# Patient Record
Sex: MALE | Race: WHITE | NOT HISPANIC OR LATINO | Employment: FULL TIME | URBAN - METROPOLITAN AREA
[De-identification: names, ages, dates, MRNs, and addresses within clinical notes are randomized per-mention and may not be internally consistent; named-entity substitution may affect disease eponyms.]

---

## 2020-02-20 ENCOUNTER — OFFICE VISIT (OUTPATIENT)
Dept: FAMILY MEDICINE | Facility: CLINIC | Age: 30
End: 2020-02-20
Payer: COMMERCIAL

## 2020-02-20 VITALS
HEART RATE: 45 BPM | DIASTOLIC BLOOD PRESSURE: 67 MMHG | TEMPERATURE: 97.7 F | SYSTOLIC BLOOD PRESSURE: 111 MMHG | WEIGHT: 200.8 LBS | BODY MASS INDEX: 28.75 KG/M2 | HEIGHT: 70 IN | OXYGEN SATURATION: 98 % | RESPIRATION RATE: 20 BRPM

## 2020-02-20 DIAGNOSIS — N20.0 KIDNEY STONE: Primary | ICD-10-CM

## 2020-02-20 DIAGNOSIS — F19.10 POLYSUBSTANCE ABUSE (H): ICD-10-CM

## 2020-02-20 DIAGNOSIS — N20.0 NEPHROLITHIASIS: ICD-10-CM

## 2020-02-20 PROBLEM — F10.21 ALCOHOLISM IN REMISSION (H): Status: ACTIVE | Noted: 2020-02-20

## 2020-02-20 PROBLEM — D17.9 LIPOMA: Status: ACTIVE | Noted: 2020-02-20

## 2020-02-20 LAB
BILIRUBIN UR: NEGATIVE MG/DL
BLOOD UR: ABNORMAL MG/DL
BUN SERPL-MCNC: 16 MG/DL (ref 5–24)
CALCIUM SERPL-MCNC: 9.1 MG/DL (ref 8.5–10.4)
CHLORIDE SERPLBLD-SCNC: 102 MMOL/L (ref 94–109)
CO2 SERPL-SCNC: 28 MMOL/L (ref 20–32)
CREAT SERPL-MCNC: 1.1 MG/DL (ref 0.8–1.5)
EGFR CALCULATED (BLACK REFERENCE): >90 ML/MIN
EGFR CALCULATED (NON BLACK REFERENCE): 83.5 ML/MIN
GLUCOSE SERPL-MCNC: 92 MG/DL (ref 60–109)
GLUCOSE URINE: NEGATIVE
KETONES UR QL: NEGATIVE MG/DL
LEUKOCYTE ESTERASE UR: NEGATIVE
NITRITE UR QL STRIP: NEGATIVE MG/DL
PH UR STRIP: 5.5 [PH] (ref 4.5–8)
POTASSIUM SERPL-SCNC: 3.5 MMOL/L (ref 3.4–5.3)
PROTEIN UR: NEGATIVE MG/DL
SODIUM SERPL-SCNC: 141 MMOL/L (ref 133–144)
SP GR UR STRIP: <=1.005 (ref 1–1.03)
UROBILINOGEN UR STRIP-ACNC: ABNORMAL E.U./DL

## 2020-02-20 ASSESSMENT — MIFFLIN-ST. JEOR: SCORE: 1877.07

## 2020-02-20 NOTE — PROGRESS NOTES
Preceptor Attestation:   Patient seen, evaluated and discussed with the resident. I have verified the content of the note, which accurately reflects my assessment of the patient and the plan of care.  Supervising Physician:Sae Jaime MD  [facility]

## 2020-02-20 NOTE — PROGRESS NOTES
"  Assessment and Plan     Nephrolithiasis: First episode 1/30/20 at [facility] ED. Described RLQ/Right pelvic pain, that was sudden and severe, but passed after 5-6hrs. Hematuria as well. No flank pain  - Pain resolved.  - Had red/pink urine for ~ 2 weeks after. Has since resolved.  - Recommended staying hydrated   - BMP  - UA    Hx Polysubstance Abuse: 5 years sober from alcohol. 2-3 years sober from \"benzos, meth, adderall, opioids\" and others.   - Does take Viagra \"for fun\"  - Recommended he stop taking Viagra as he doesn't have a problem maintaining or getting an erection.    Options for treatment and follow-up care were reviewed with the patient and/or guardian. Howard Motta and/or guardian engaged in the decision making process and verbalized understanding of the options discussed and agreed with the final plan.    Jerrod Koehler DO, JACKY  [facility], PGY-1    Precepted patient with Dr. Sae Jaime       HPI:   Howard Motta is a 29 year old male who presents to clinic today for   Chief Complaint   Patient presents with     ER F/U     Kidney stone?       ED visit on 1/30 for RLQ/Rpelvic pain, resolved with urination. No buldge, pain vague not completely pin pointable. Scrotum to RLQ. Started when lifted jewelery case in AM.  Occassional pain after in testicle.     Runner, frequently >12miles 3x per week. Pink pee for 1 week after.     Cysts on L arm, multiple. Dx as Lipomas previously.     No supplements. Takes viagra for fun. Decreased interest in girl friend. No problems getting or maintaining erection.    Hx drug use, benzos, opioids, amphetamines, meth, prescription drugs. 2-3 years. 5 years sober alcohol. Depression after that.     Denies CP, SOB, abdominal pain, pain with urination/testicles, abdominal pain, changes in vision/hearing/GI, or any other concerns.          Review of Systems:     A complete 12 point ROS was negative " "unless otherwise noted in HPI.          PMHX:   Active Problems List  Patient Active Problem List   Diagnosis     Lipoma     Alcoholism in remission (H)     Active problem list reviewed and updated.    Current Medications  No current outpatient medications on file.     Medication list reviewed and updated.    Social History  Social History     Tobacco Use     Smoking status: Never Smoker     Smokeless tobacco: Never Used   Substance Use Topics     Alcohol use: None     Drug use: None     History   Drug Use Not on file     Social history reviewed and updated.    Family History  No family history on file.  Family history reviewed and updated.    Allergies  Allergies   Allergen Reactions     No Known Allergies      Allergies and medication intolerances reviewed and updated.         Physical Exam:     Vitals:    02/20/20 1416   BP: 111/67   Pulse: (!) 45   Resp: 20   Temp: 97.7  F (36.5  C)   SpO2: 98%   Weight: 91.1 kg (200 lb 12.8 oz)   Height: 1.778 m (5' 10\")     Body mass index is 28.81 kg/m .    GENERAL APPEARANCE: alert, appears stated age, no acute distress  HEENT: Eyes grossly normal to inspection, nares normal, and mouth and throat without erythema, ulcers, or lesions  RESP: lungs clear to auscultation - no rales, rhonchi, or wheezes  CV: regular rate and rhythm, no murmur, click, rub, or gallop  ABDOMEN: soft, nontender   MSK: extremities normal, no gross deformities noted, no lower extremity edema  SKIN: multiple lipomas on L arm, largest ~3cm diameter.   PSYCH: mood and affect normal/bright       "

## 2020-02-21 PROBLEM — F19.10 POLYSUBSTANCE ABUSE (H): Status: ACTIVE | Noted: 2020-02-21

## 2020-02-21 PROBLEM — N20.0 NEPHROLITHIASIS: Status: ACTIVE | Noted: 2020-02-21

## 2021-11-08 ENCOUNTER — OFFICE VISIT (OUTPATIENT)
Dept: FAMILY MEDICINE | Facility: CLINIC | Age: 31
End: 2021-11-08
Payer: COMMERCIAL

## 2021-11-08 VITALS
RESPIRATION RATE: 16 BRPM | TEMPERATURE: 97.9 F | BODY MASS INDEX: 28.84 KG/M2 | OXYGEN SATURATION: 97 % | HEART RATE: 60 BPM | WEIGHT: 206 LBS | SYSTOLIC BLOOD PRESSURE: 137 MMHG | HEIGHT: 71 IN | DIASTOLIC BLOOD PRESSURE: 80 MMHG

## 2021-11-08 DIAGNOSIS — Z00.00 WELLNESS EXAMINATION: Primary | ICD-10-CM

## 2021-11-08 DIAGNOSIS — Z23 NEED FOR PROPHYLACTIC VACCINATION AND INOCULATION AGAINST INFLUENZA: ICD-10-CM

## 2021-11-08 DIAGNOSIS — F32.A DEPRESSION, UNSPECIFIED DEPRESSION TYPE: ICD-10-CM

## 2021-11-08 LAB
FERRITIN SERPL-MCNC: 186 NG/ML (ref 27–300)
HIV 1+2 AB+HIV1 P24 AG SERPL QL IA: NEGATIVE

## 2021-11-08 PROCEDURE — 90686 IIV4 VACC NO PRSV 0.5 ML IM: CPT | Performed by: STUDENT IN AN ORGANIZED HEALTH CARE EDUCATION/TRAINING PROGRAM

## 2021-11-08 PROCEDURE — 90471 IMMUNIZATION ADMIN: CPT | Performed by: STUDENT IN AN ORGANIZED HEALTH CARE EDUCATION/TRAINING PROGRAM

## 2021-11-08 PROCEDURE — 82728 ASSAY OF FERRITIN: CPT | Performed by: STUDENT IN AN ORGANIZED HEALTH CARE EDUCATION/TRAINING PROGRAM

## 2021-11-08 PROCEDURE — 86803 HEPATITIS C AB TEST: CPT | Performed by: STUDENT IN AN ORGANIZED HEALTH CARE EDUCATION/TRAINING PROGRAM

## 2021-11-08 PROCEDURE — 87389 HIV-1 AG W/HIV-1&-2 AB AG IA: CPT | Performed by: STUDENT IN AN ORGANIZED HEALTH CARE EDUCATION/TRAINING PROGRAM

## 2021-11-08 PROCEDURE — 99395 PREV VISIT EST AGE 18-39: CPT | Mod: 25 | Performed by: STUDENT IN AN ORGANIZED HEALTH CARE EDUCATION/TRAINING PROGRAM

## 2021-11-08 PROCEDURE — 36415 COLL VENOUS BLD VENIPUNCTURE: CPT | Performed by: STUDENT IN AN ORGANIZED HEALTH CARE EDUCATION/TRAINING PROGRAM

## 2021-11-08 ASSESSMENT — MIFFLIN-ST. JEOR: SCORE: 1904.41

## 2021-11-08 NOTE — PROGRESS NOTES
Male Physical Note         CC     Howard Motta is an 31 year old male who presents for preventative health visit.     History reviewed. No pertinent past medical history.         HPI     Besides routine health maintenance. Would also like referral to behavioral health to see a therapist again. Notes mild depression/low mood since breaking up with girl friend recently. No SI/SH.            Health Screening, Medical and Social History     Diet (5-7 servings of fruits/veg daily): Yes   Exercise (30 min accumulated most days):Yes  Dental Care: Yes   Seat Belt Use: Yes     Tobacco Use/Smoking: None  Illicit Drug Use: None  Do you use alcohol? No    Sexually Active: Yes  Sexual concerns: Yes   STD History: Chlamydia but treated for this.     Abuse: Current or Past(Physical, Sexual or Emotional)- No  Do you feel safe in your environment - Yes    Social History     Tobacco Use     Smoking status: Never Smoker     Smokeless tobacco: Never Used   Substance Use Topics     Alcohol use: Not on file     The patient does not drink >3 drinks per day nor >7 drinks per week.    Reviewed health maintenance and updated orders accordingly - Yes    All Histories reviewed and updated in Epic.         Review of Systems:     CONSTITUTIONAL: NEGATIVE for fever, chills, change in weight  INTEGUMENTARY/SKIN: NEGATIVE for worrisome rashes, moles or lesions  EYES: NEGATIVE for vision changes or irritation  ENT: NEGATIVE for ear, mouth and throat problems  RESP: NEGATIVE for significant cough or SOB  CV: NEGATIVE for chest pain, palpitations or peripheral edema  GI: NEGATIVE for nausea, abdominal pain, heartburn, or change in bowel habits   male: negative for dysuria, hematuria, decreased urinary stream, erectile dysfunction, urethral discharge  NEURO: NEGATIVE for weakness, dizziness or paresthesias  PSYCHIATRIC: NEGATIVE for changes in mood or affect         Physical Exam:     /80   Pulse 60   Temp 97.9  F (36.6  C) (Oral)   Resp 16   " Ht 1.8 m (5' 10.87\")   Wt 93.4 kg (206 lb)   SpO2 97%   BMI 28.84 kg/m    GENERAL APPEARANCE: healthy, alert and no distress  EYES: Eyes grossly normal to inspection, PERRL and conjunctivae and sclerae normal  HENT: ear canals and TM's normal, nose and mouth without ulcers or lesions, oropharynx clear and oral mucous membranes moist  NECK: no adenopathy, no asymmetry, masses, or scars and thyroid normal to palpation  RESP: lungs clear to auscultation - no rales, rhonchi or wheezes  CV: regular rates and rhythm, normal S1 S2, no S3 or S4, no murmur, click or rub, no peripheral edema and peripheral pulses strong  ABDOMEN: soft, nontender, no hepatosplenomegaly, no masses and bowel sounds normal  RECTAL: normal sphincter tone, no rectal masses, prostate of normal size, smooth, nontender without nodules or masses  MS: no musculoskeletal defects are noted and gait is age appropriate without ataxia. Negative right knee ant/post drawer, TTP, valgus/varus strain, Thessaly test.   SKIN: no suspicious lesions or rashes  NEURO: Normal strength and tone, sensory exam grossly normal, mentation intact and speech normal  PSYCH: mentation appears normal and affect normal/bright         Counseling:     Counseling:  Reviewed preventive health counseling, as reflected in patient instructions       Regular exercise       Healthy diet/nutrition       Safe sex practices/STD prevention       Consider Hep C screening for all patients one time for ages 18-79 years       HIV screeninx in teen years, 1x in adult years, and at intervals if high risk    Assessment and Plan:      Howard was seen today for physical, knee pain and imm/inj.    Diagnoses and all orders for this visit:    Need for prophylactic vaccination and inoculation against influenza  -     INFLUENZA VACCINE IM > 6 MONTHS VALENT IIV4 (AFLURIA/FLUZONE)    Wellness examination  -     Hepatitis C antibody; Future  -     HIV Ag/Ab Screen Cascade; Future  -     Ferritin; " Future  -     Hepatitis C antibody  -     HIV Ag/Ab Screen Cascade  -     Ferritin    Depression, unspecified depression type  -     MENTAL HEALTH REFERRAL  -; Future          Jerrod Koehler DO, MBA    Precepted today with:Dr. Pretty

## 2021-11-08 NOTE — PROGRESS NOTES
Preceptor Attestation:   Patient seen, evaluated and discussed with the resident. I have verified the content of the note, which accurately reflects my assessment of the patient and the plan of care.   Supervising Physician:  Marco Antonio Pretty MD

## 2021-11-09 LAB — HCV AB SERPL QL IA: NEGATIVE

## 2021-11-11 ENCOUNTER — DOCUMENTATION ONLY (OUTPATIENT)
Dept: PSYCHOLOGY | Facility: CLINIC | Age: 31
End: 2021-11-11
Payer: COMMERCIAL

## 2021-11-11 ENCOUNTER — TELEPHONE (OUTPATIENT)
Dept: FAMILY MEDICINE | Facility: CLINIC | Age: 31
End: 2021-11-11
Payer: COMMERCIAL

## 2021-11-11 NOTE — PROGRESS NOTES
Patient feeling down after recent breakup with girlfriend. Can be seen by the following:    [facility] in person available 21  [address]  [address]  [place], [place] [address]  [phone] Phone  [phone] Fax  Hours: M-F 7:30-5:30pm    [facility] [place]- virtual or telephone care only  [address]  [place], [place] [address] [phone] Phone  [phone] Fax  Hours:  Monday - Friday 8:30 - 5:00pm  8:00am - 5:00pm Saturday    [facility]- some providers offering in person care  [address]  [place], [place] [address] [phone] Phone  [phone] Fax  M-F 7:30AM-7PM  Saturday 8AM-2PM    [facility])- some providers offering in person care  [address] [place] [address] [address],   [place] [address]  [phone] Phone  [phone] Fax  M-Th: 8:30-5pm  F: 8:30-4:30pm    [facility]- both virtual and in person, many offering in person  [address] [place] [address]  [phone] Phone  [phone] Fax  M-Th 8-8pm  F 8-4:30pm        Patient Demographics    Patient Name   Howard Palomino MRN   [id number] Legal Sex   Male    1990 [employer]   [id number] Address   [address] [place] [address] Phone   [phone] (Home) *Preferred*   [phone] (Mobile)     Primary Visit Coverage    Payer Plan Sponsor Code Group Number Group Name Payer Address Payer Phone   [facility] [employer] [id number]  [address] [address] [phone]        [place] [address]        Primary Visit Coverage Subscriber    Subscriber ID Subscriber Name Subscriber N Subscriber Address   [id number] HOWARD PALOMINO [id number] [place] [address]       Order Information    Date Department Ordering/Authorizing   2021 [facility] Steven Koehler MD      Order Providers    Authorizing Provider Encounter Provider   Steven Koehler MD Kuzj, Lucas John Gregory, MD     Future Order Information    Expected Expires      11/8/2021 (Approximate) 11/8/2022               Associated Diagnoses    Depression, unspecified depression type         Comments    Use this form for behavioral health consults and assessments. The referral coordinator will help to determine whether patients are best served by clinic behavioral health staff or by community providers.     Presenting Problem: depressed mood     Currently having suicidal thoughts: No   Previous psych hospitalization: No       Type of referral(s) requested (indicate all that apply):   - Psychotherapy      needed:No   Language: English            Order Questions    Question Answer   Adult or Child/Adolescent: Adult   Location: [facility]

## 2021-11-11 NOTE — TELEPHONE ENCOUNTER
Outreach Date: 11/11/21 Time: 1610.  Able to be Reached: Yes  Voicemail Left: Yes  Reason for Referral: depressed mood  Location of appointment: [facility] Laurence Hernandez  Appointment Date:   Appointment Time:   Phone number of location: [phone]  Fax number of location: [phone]  Additional Information: Faxed referral information to Fransisco Pagan RN

## 2021-11-11 NOTE — TELEPHONE ENCOUNTER
Outreach Date: 11/11/21 Time: 1419.  Able to be Reached: No  Voicemail Left: Yes  Reason for Referral: depressed mood  Location of appointment:   Appointment Date:   Appointment Time:   Phone number of location:   Fax number of location:   Additional Information: called but no answer. Left VM to call back on unidentified VM.  Ej GASPAR

## 2021-12-20 ENCOUNTER — OFFICE VISIT (OUTPATIENT)
Dept: FAMILY MEDICINE | Facility: CLINIC | Age: 31
End: 2021-12-20
Payer: COMMERCIAL

## 2021-12-20 VITALS
BODY MASS INDEX: 29.49 KG/M2 | SYSTOLIC BLOOD PRESSURE: 117 MMHG | WEIGHT: 206 LBS | OXYGEN SATURATION: 98 % | TEMPERATURE: 97.9 F | HEART RATE: 55 BPM | RESPIRATION RATE: 18 BRPM | DIASTOLIC BLOOD PRESSURE: 66 MMHG | HEIGHT: 70 IN

## 2021-12-20 DIAGNOSIS — Z23 HIGH PRIORITY FOR 2019-NCOV VACCINE: ICD-10-CM

## 2021-12-20 DIAGNOSIS — Z11.3 ROUTINE SCREENING FOR STI (SEXUALLY TRANSMITTED INFECTION): Primary | ICD-10-CM

## 2021-12-20 PROCEDURE — 86780 TREPONEMA PALLIDUM: CPT | Performed by: STUDENT IN AN ORGANIZED HEALTH CARE EDUCATION/TRAINING PROGRAM

## 2021-12-20 PROCEDURE — 99213 OFFICE O/P EST LOW 20 MIN: CPT | Mod: 25 | Performed by: STUDENT IN AN ORGANIZED HEALTH CARE EDUCATION/TRAINING PROGRAM

## 2021-12-20 PROCEDURE — 87389 HIV-1 AG W/HIV-1&-2 AB AG IA: CPT | Performed by: STUDENT IN AN ORGANIZED HEALTH CARE EDUCATION/TRAINING PROGRAM

## 2021-12-20 PROCEDURE — 87591 N.GONORRHOEAE DNA AMP PROB: CPT | Performed by: STUDENT IN AN ORGANIZED HEALTH CARE EDUCATION/TRAINING PROGRAM

## 2021-12-20 PROCEDURE — 91300 COVID-19,PF,PFIZER (12+ YRS): CPT | Performed by: STUDENT IN AN ORGANIZED HEALTH CARE EDUCATION/TRAINING PROGRAM

## 2021-12-20 PROCEDURE — 36415 COLL VENOUS BLD VENIPUNCTURE: CPT | Performed by: STUDENT IN AN ORGANIZED HEALTH CARE EDUCATION/TRAINING PROGRAM

## 2021-12-20 PROCEDURE — 87491 CHLMYD TRACH DNA AMP PROBE: CPT | Performed by: STUDENT IN AN ORGANIZED HEALTH CARE EDUCATION/TRAINING PROGRAM

## 2021-12-20 PROCEDURE — 0004A COVID-19,PF,PFIZER (12+ YRS): CPT | Performed by: STUDENT IN AN ORGANIZED HEALTH CARE EDUCATION/TRAINING PROGRAM

## 2021-12-20 ASSESSMENT — MIFFLIN-ST. JEOR: SCORE: 1890.66

## 2021-12-20 NOTE — PROGRESS NOTES
"     HPI:       Howard Motta is a 31 year old  male with PMH of alcoholism in remission who presents for STI screening.    Recently had intervourse with \"high risk partners\". Patient has multiple sexual partners, has 4-5 currently. Sometimes uses condoms. No penile dishcarge, no dysuria. No skin changes. STD check today. Last had unprotected intercourse this week.          PMHX:     Patient Active Problem List   Diagnosis     Lipoma     Alcoholism in remission (H)     Nephrolithiasis     Polysubstance abuse (H)       No current outpatient medications on file.          Allergies   Allergen Reactions     No Known Allergies        No results found for this or any previous visit (from the past 24 hour(s)).         Review of Systems:     12 point review of systems negative unless stated in HPI          Physical Exam:     Vitals:    12/20/21 1152   BP: 117/66   BP Location: Right arm   Pulse: 55   Resp: 18   Temp: 97.9  F (36.6  C)   TempSrc: Oral   SpO2: 98%   Weight: 93.4 kg (206 lb)   Height: 1.778 m (5' 10\")     Body mass index is 29.56 kg/m .    GENERAL APPEARANCE: healthy, alert and no distress  EYES: Eyes grossly normal to inspection  RESP: lungs clear to auscultation - no rales, rhonchi or wheezes  CV: regular rate and rhythm,  and no murmur, click,  rub or gallop  ABDOMEN: soft, nontender, no suprapubic tenderness.   MS: extremities normal- no gross deformities noted  SKIN: no suspicious lesions or rashes  NEURO: Normal strength and tone, sensory exam grossly normal, mentation appears intact and speech normal  PSYCH: mood and affect normal/bright      Assessment and Plan     1. Routine screening for STI (sexually transmitted infection)  Patient currently asymptomatic but would like STI screen. Counseled him that consistent condom use is the best way to prevent STI's. Patient stated understanding. Condoms supplied to patient from clinic.   - Syphilis Screen Cascade; Future  - HIV Ag/Ab Screen Cascade; Future  - " Neisseria gonorrhoeae PCR; Future  - Chlamydia trachomatis PCR; Future  - Syphilis Screen Cascade  - HIV Ag/Ab Screen Cascade  - Neisseria gonorrhoeae PCR  - Chlamydia trachomatis PCR    2. High priority for 2019-nCoV vaccine  - COVID-19,PF,PFIZER (12+ Yrs PURPLE LABEL)    Options for treatment and follow-up care were reviewed with the patient and/or guardian. Pt and/or guardian engaged in the decision making process and verbalized understanding of the options discussed and agreed with the final plan.    Precepted today with: MD Nila Fleming MD  [facility] Family Medicine Resident PGY-3  [facility]

## 2021-12-20 NOTE — LETTER
December 24, 2021      Howard ORTA Kalia  [address] [place] [address]        Dear ,    We are writing to inform you of your test results.    Your STI panel including gonorrhea, chlamydia, HIV, and  syphilis are all negative.      Resulted Orders   Syphilis Screen Cascade   Result Value Ref Range    Treponema Antibody Total Nonreactive Nonreactive   HIV Ag/Ab Screen Cascade   Result Value Ref Range    HIV Antigen Antibody Combo Negative Negative   Neisseria gonorrhoeae PCR   Result Value Ref Range    Neisseria gonorrhoeae Negative Negative      Comment:      Negative for N. gonorrhoeae rRNA by transcription mediated amplification. A negative result by transcription mediated amplification does not preclude the presence of C. trachomatis infection because results are dependent on proper and adequate collection, absence of inhibitors and sufficient rRNA to be detected.   Chlamydia trachomatis PCR   Result Value Ref Range    Chlamydia trachomatis Negative Negative      Comment:      A negative result by transcription mediated amplification does not preclude the presence of C. trachomatis infection because results are dependent on proper and adequate collection, absence of inhibitors and sufficient rRNA to be detected.       If you have any questions or concerns, please call the clinic at the number listed above.       Sincerely,      Nila Lezama MD

## 2021-12-21 LAB
C TRACH DNA SPEC QL NAA+PROBE: NEGATIVE
HIV 1+2 AB+HIV1 P24 AG SERPL QL IA: NEGATIVE
N GONORRHOEA DNA SPEC QL NAA+PROBE: NEGATIVE
T PALLIDUM AB SER QL: NONREACTIVE

## 2021-12-23 NOTE — RESULT ENCOUNTER NOTE
Please call patient with the following:    Elías [place],    Your STI panel including gonorrhea, chlamydia, HIV, and  syphilis are all negative.    Dr. Lezama

## 2021-12-27 NOTE — PROGRESS NOTES
Faculty Supervision of Residents   I have examined this patient and the medical care has been evaluated and discussed with the resident. See resident note outlining our discussion.      Naila Snow MD

## 2022-01-09 ENCOUNTER — TELEPHONE (OUTPATIENT)
Dept: FAMILY MEDICINE | Facility: CLINIC | Age: 32
End: 2022-01-09
Payer: COMMERCIAL

## 2022-01-09 NOTE — CONFIDENTIAL NOTE
1/8/22     Called clinic number for question. I was on call.     Abdominal pain  Just wants to make sure it is not kidney stones    Onset: this AM.   Course: constant, maybe a bit better   Location: diffuse abdomen   Radiation: no  Description: dull   Intensity: was 7/10, now down to 3/10.   Exacerbating factors: not eating   Remitting factors: eating. Did try some pepto bismol; may have helped  He noticed that he has been eating more cheese recently. Started drinking carbonated water 3 days ago after many months of not doing slo.    Symptoms associated: none   Denies: nausea, vomiting, urinary symptoms (such as dysuria, hematuria, etc), stool changes, blood in stool, fever, chills, chest pain, dyspnea, palpitations.     Discussed that I am reassured his pain is getting better. It does not sound as if it needs immediate in-person evaluation. Consideration would be GERD vs carbonation reaction vs constipation / lactose intolerance (with cheese) vs less likely ulcer vs other. Recommended continuing peptobismol if that helps. Could try tums at the least. For consipation, decrease cheese, could try stool softeners). If he tries these measures and still no improvement, please be seen in clinic. If he suddenly worsens, likely present to urgent care vs ED.     Jony Clancy,    [facility] Family Medicine Resident   Pager#[phone]

## 2022-09-19 ENCOUNTER — OFFICE VISIT (OUTPATIENT)
Dept: FAMILY MEDICINE | Facility: CLINIC | Age: 32
End: 2022-09-19
Payer: COMMERCIAL

## 2022-09-19 VITALS
OXYGEN SATURATION: 98 % | DIASTOLIC BLOOD PRESSURE: 74 MMHG | BODY MASS INDEX: 29.78 KG/M2 | SYSTOLIC BLOOD PRESSURE: 123 MMHG | RESPIRATION RATE: 18 BRPM | HEART RATE: 55 BPM | TEMPERATURE: 98.1 F | WEIGHT: 208 LBS | HEIGHT: 70 IN

## 2022-09-19 DIAGNOSIS — D17.9 MULTIPLE LIPOMAS: ICD-10-CM

## 2022-09-19 DIAGNOSIS — Z11.3 SCREEN FOR STD (SEXUALLY TRANSMITTED DISEASE): Primary | ICD-10-CM

## 2022-09-19 PROCEDURE — 87389 HIV-1 AG W/HIV-1&-2 AB AG IA: CPT | Performed by: STUDENT IN AN ORGANIZED HEALTH CARE EDUCATION/TRAINING PROGRAM

## 2022-09-19 PROCEDURE — 87491 CHLMYD TRACH DNA AMP PROBE: CPT | Performed by: STUDENT IN AN ORGANIZED HEALTH CARE EDUCATION/TRAINING PROGRAM

## 2022-09-19 PROCEDURE — 86780 TREPONEMA PALLIDUM: CPT | Performed by: STUDENT IN AN ORGANIZED HEALTH CARE EDUCATION/TRAINING PROGRAM

## 2022-09-19 PROCEDURE — 87591 N.GONORRHOEAE DNA AMP PROB: CPT | Performed by: STUDENT IN AN ORGANIZED HEALTH CARE EDUCATION/TRAINING PROGRAM

## 2022-09-19 PROCEDURE — 36415 COLL VENOUS BLD VENIPUNCTURE: CPT | Performed by: STUDENT IN AN ORGANIZED HEALTH CARE EDUCATION/TRAINING PROGRAM

## 2022-09-19 PROCEDURE — 99213 OFFICE O/P EST LOW 20 MIN: CPT | Mod: GC | Performed by: STUDENT IN AN ORGANIZED HEALTH CARE EDUCATION/TRAINING PROGRAM

## 2022-09-19 NOTE — PROGRESS NOTES
CHIEF COMPLAINT                                                      Chief Complaint   Patient presents with     STD     testing       ASSESSMENT/PLAN:     (Z11.3) Screen for STD (sexually transmitted disease)  (primary encounter diagnosis)  Plan: Chlamydia trachomatis/Neisseria gonorrhoeae by         PCR - Clinic Collect, HIV Antigen Antibody         Combo, Syphilis Screen Cascade (RPR/VDRL)    (D17.9) Multiple lipomas  Comment: seems consistent w/ familial multiple lipomatosis.  Plan:   -Monitor, advised letting us know if they ever cause him issue or if he wants them removed for cosmetic reasons        Options for treatment and follow-up care were reviewed with the patient and/or guardian. Howard Motta and/or guardian engaged in the decision making process and verbalized understanding of the options discussed and agreed with the final plan    Precepted with Dr. Candelario.    Mack Hernandez MD - PGY3  [facility]      SUBJECTIVE:                                                    Howard Motta is a 32 year old year old male who presents to clinic today for the following health issues:    STI screening  H/o chlamydia infection x 2  Girlfriend w/ genital HSV - has been tested since and was negative  Has had 10-12 partners in last 6 months  Using condoms 100%, it broke once  No symptoms today, just wants check up  No fevers, no penile discharge  Just sexually active with females    Lumps on arms  Have been growing since age 24  Sister has same thing  Mostly on left arm  No fevers/chills, h/o occasional night sweats    ----------------------------------------------------------------------------------------------------------------------  Patient Active Problem List   Diagnosis     Lipoma     Alcoholism in remission (H)     Nephrolithiasis     Polysubstance abuse (H)     History reviewed. No pertinent surgical history.    Social History     Tobacco Use     Smoking status: Never Smoker      "Smokeless tobacco: Never Used   Substance Use Topics     Alcohol use: Not on file     Family History   Problem Relation Age of Onset     Uterine Cancer Father          Problem list and past medical, surgical, social, and family histories reviewed & adjusted, as indicated.    No current outpatient medications on file.     Medication list reviewed and updated as indicated.    Allergies   Allergen Reactions     No Known Allergies      Allergies reviewed and updated as indicated.  ----------------------------------------------------------------------------------------------------------------------  ROS:  Constitutional, HEENT, cardiovascular, pulmonary, GI, musculoskeletal, neuro, skin, and psych systems are negative, except as otherwise noted.    OBJECTIVE:     /74   Pulse 55   Temp 98.1  F (36.7  C) (Oral)   Resp 18   Ht 1.778 m (5' 10\")   Wt 94.3 kg (208 lb)   SpO2 98%   BMI 29.84 kg/m    Body mass index is 29.84 kg/m .  Exam:  Constitutional: healthy, alert and no distress  Head: Normocephalic. Atraumatic  Neck: Neck supple. FROM  Cardiovascular: Acyanotic  Respiratory: Normal chest rise. Non-labored breathing.  Musculoskeletal: extremities normal- no gross deformities noted, gait normal and normal muscle tone  Skin: multiple firm, rubbery subcutaneous nodules on left arm and right medial thigh  Neurologic: Gait normal. CN II-XII grossly intact  Psychiatric: mentation appears normal and affect normal/bright      "

## 2022-09-19 NOTE — LETTER
September 20, 2022      Howard Motta  [address] [place] [address]        Dear ,    We are writing to inform you of your test results.    Your test results fall within the expected range(s) or remain unchanged from previous results.  Please continue with current treatment plan.    Resulted Orders   Chlamydia trachomatis/Neisseria gonorrhoeae by PCR - Clinic Collect   Result Value Ref Range    Chlamydia Trachomatis Negative Negative      Comment:      Negative for C. trachomatis rRNA by transcription mediated amplification.   A negative result by transcription mediated amplification does not preclude the presence of infection because results are dependent on proper and adequate collection, absence of inhibitors and sufficient rRNA to be detected.    Neisseria gonorrhoeae Negative Negative      Comment:      Negative for N. gonorrhoeae rRNA by transcription mediated amplification. A negative result by transcription mediated amplification does not preclude the presence of C. trachomatis infection because results are dependent on proper and adequate collection, absence of inhibitors and sufficient rRNA to be detected.   HIV Antigen Antibody Combo   Result Value Ref Range    HIV Antigen Antibody Combo Nonreactive Nonreactive      Comment:      HIV-1 p24 Ag & HIV-1/HIV-2 Ab Not Detected   Syphilis Screen Cascade (RPR/VDRL)   Result Value Ref Range    Treponema Antibody Total Nonreactive Nonreactive       If you have any questions or concerns, please call the clinic at the number listed above.       Sincerely,      Mack Hernandez MD

## 2022-09-20 LAB
C TRACH DNA SPEC QL PROBE+SIG AMP: NEGATIVE
HIV 1+2 AB+HIV1 P24 AG SERPL QL IA: NONREACTIVE
N GONORRHOEA DNA SPEC QL NAA+PROBE: NEGATIVE
T PALLIDUM AB SER QL: NONREACTIVE

## 2022-09-21 NOTE — PROGRESS NOTES
Preceptor Attestation:  Patient's case reviewed and discussed with the resident, Mack Hernandez MD, and I personally evaluated the patient. I agree with written assessment and plan of care.    Supervising Physician:  Sue Candelario MD   [facility]

## 2024-12-03 ENCOUNTER — OFFICE VISIT (OUTPATIENT)
Dept: FAMILY MEDICINE | Facility: CLINIC | Age: 34
End: 2024-12-03
Payer: COMMERCIAL

## 2024-12-03 VITALS
DIASTOLIC BLOOD PRESSURE: 82 MMHG | SYSTOLIC BLOOD PRESSURE: 135 MMHG | OXYGEN SATURATION: 98 % | TEMPERATURE: 98.7 F | RESPIRATION RATE: 20 BRPM | HEART RATE: 80 BPM

## 2024-12-03 DIAGNOSIS — Z71.9 ENCOUNTER FOR COUNSELING: ICD-10-CM

## 2024-12-03 DIAGNOSIS — Z31.41 FERTILITY TESTING: ICD-10-CM

## 2024-12-03 DIAGNOSIS — Z11.3 SCREENING EXAMINATION FOR VENEREAL DISEASE: ICD-10-CM

## 2024-12-03 DIAGNOSIS — Z13.9 SCREENING FOR CONDITION: Primary | ICD-10-CM

## 2024-12-03 DIAGNOSIS — A09 TRAVELER'S DIARRHEA: ICD-10-CM

## 2024-12-03 DIAGNOSIS — Z72.51 HIGH RISK HETEROSEXUAL BEHAVIOR: ICD-10-CM

## 2024-12-03 DIAGNOSIS — Z00.00 ROUTINE GENERAL MEDICAL EXAMINATION AT A HEALTH CARE FACILITY: Primary | ICD-10-CM

## 2024-12-03 RX ORDER — AZITHROMYCIN 500 MG/1
500 TABLET, FILM COATED ORAL DAILY
Qty: 3 TABLET | Refills: 0 | Status: SHIPPED | OUTPATIENT
Start: 2024-12-03 | End: 2024-12-06

## 2024-12-03 RX ORDER — LOPERAMIDE HYDROCHLORIDE 2 MG/1
TABLET ORAL
Qty: 30 TABLET | Refills: 0 | Status: SHIPPED | OUTPATIENT
Start: 2024-12-03

## 2024-12-03 SDOH — HEALTH STABILITY: PHYSICAL HEALTH: ON AVERAGE, HOW MANY DAYS PER WEEK DO YOU ENGAGE IN MODERATE TO STRENUOUS EXERCISE (LIKE A BRISK WALK)?: 3 DAYS

## 2024-12-03 ASSESSMENT — SOCIAL DETERMINANTS OF HEALTH (SDOH): HOW OFTEN DO YOU GET TOGETHER WITH FRIENDS OR RELATIVES?: ONCE A WEEK

## 2024-12-03 NOTE — PATIENT INSTRUCTIONS
Travel Safety Recommendations    Avoid cooked food served at room temperature.   Avoid raw food, including raw vegetables unless they can be washed thoroughly.   Drink only beverages from sealed bottles or cans.   Water is safe if it has been boiled or chemically treated.   Avoid ice unless made from bottled/disinfected water.    Cover exposed skin.   Use an appropriate insect repellent. (see below)   Use permethrin-treated clothing and gear (such as boots, pants, socks, and tents). Travelers can buy pre-treated clothing and gear or treat them at home. Treated clothing remains protective after multiple washings. Permethrin should NOT be used directly on skin.   Stay and sleep under in air-conditioned or screened rooms.   Use a bed net if the sleeping area is exposed to the outdoors.    Choose safe vehicles and avoid motorbikes when possible.   Wear a seatbelt or a helmet at all times.   Do not drive after drinking alcohol or ride with someone who has been drinking.   Avoid driving at night; street lighting in certain parts of the world may be poor.  If you will be driving, research what driving permits and insurance you may need. It is recommended to get an International Driving Permit (IDP).   Avoid using local, unscheduled aircraft, and fly on larger planes (more than 30 seats) when possible.      Safer Sex: Care Instructions  Overview  Safer sex is a way to reduce your risk of getting a sexually transmitted infection (STI). It can also help prevent pregnancy.  Several products can help you practice safer sex and reduce your chance of STIs. One of the best is a condom. There are internal and external condoms. You can use a special rubber sheet (dental dam) for protection during oral sex. Disposable gloves can keep your hands from touching blood, semen, or other body fluids that can carry infections.  Remember that birth control methods such as diaphragms, IUDs, foams, and birth control pills do not stop you from  getting STIs.  Follow-up care is a key part of your treatment and safety. Be sure to make and go to all appointments, and call your doctor if you are having problems. It's also a good idea to know your test results and keep a list of the medicines you take.  How can you care for yourself at home?  Think about getting vaccinated to help prevent hepatitis A, hepatitis B, and human papillomavirus (HPV). They can be spread through sex.  Use a condom every time you have sex. Use an external condom, which goes on the penis. Or use an internal condom, which goes into the vagina or anus.  Make sure you use the right size external condom. A condom that's too small can break easily. A condom that's too big can slip off during sex.  Use a new condom each time you have sex. Be careful not to poke a hole in the condom when you open the wrapper.  Don't use an internal condom and an external condom at the same time.  Never use petroleum jelly (such as Vaseline), grease, hand lotion, baby oil, or anything with oil in it. These products can make holes in the condom.  After intercourse, hold the edge of the condom as you remove it. This will help keep semen from spilling out of the condom.  Do not have sex with anyone who has symptoms of an STI, such as sores on the genitals or mouth.  Do not drink a lot of alcohol or use drugs before sex.  Limit your sex partners. Sex with one partner who has sex only with you can reduce your risk of getting an STI.  Don't share sex toys. But if you do share them, use a condom and clean the sex toys between each use.  Talk to any partners before you have sex. Talk about what you feel comfortable with and whether you have any boundaries with sex. And find out if your partner or partners may be at risk for any STI. Keep in mind that a person may be able to spread an STI even if they do not have symptoms. You and any partners may want to get tested for STIs.  Where can you learn more?  Go to  "https://www.[employer].net/patiented  Enter B608 in the search box to learn more about \"Safer Sex: Care Instructions.\"  Current as of: November 27, 2023  Content Version: 14.2 2024 [employer].   Care instructions adapted under license by your healthcare professional. If you have questions about a medical condition or this instruction, always ask your healthcare professional. [employer] disclaims any warranty or liability for your use of this information.    Patient Education   Preventive Care Advice   This is general advice given by our system to help you stay healthy. However, your care team may have specific advice just for you. Please talk to your care team about your preventive care needs.  Nutrition  Eat 5 or more servings of fruits and vegetables each day.  Try wheat bread, brown rice and whole grain pasta (instead of white bread, rice, and pasta).  Get enough calcium and vitamin D. Check the label on foods and aim for 100% of the RDA (recommended daily allowance).  Lifestyle  Exercise at least 150 minutes each week  (30 minutes a day, 5 days a week).  Do muscle strengthening activities 2 days a week. These help control your weight and prevent disease.  No smoking.  Wear sunscreen to prevent skin cancer.  Have a dental exam and cleaning every 6 months.  Yearly exams  See your health care team every year to talk about:  Any changes in your health.  Any medicines your care team has prescribed.  Preventive care, family planning, and ways to prevent chronic diseases.  Shots (vaccines)   HPV shots (up to age 26), if you've never had them before.  Hepatitis B shots (up to age 59), if you've never had them before.  COVID-19 shot: Get this shot when it's due.  Flu shot: Get a flu shot every year.  Tetanus shot: Get a tetanus shot every 10 years.  Pneumococcal, hepatitis A, and RSV shots: Ask your care team if you need these based on your risk.  Shingles shot (for age 50 and up)  General " health tests  Diabetes screening:  Starting at age 35, Get screened for diabetes at least every 3 years.  If you are younger than age 35, ask your care team if you should be screened for diabetes.  Cholesterol test: At age 39, start having a cholesterol test every 5 years, or more often if advised.  Bone density scan (DEXA): At age 50, ask your care team if you should have this scan for osteoporosis (brittle bones).  Hepatitis C: Get tested at least once in your life.  STIs (sexually transmitted infections)  Before age 24: Ask your care team if you should be screened for STIs.  After age 24: Get screened for STIs if you're at risk. You are at risk for STIs (including HIV) if:  You are sexually active with more than one person.  You don't use condoms every time.  You or a partner was diagnosed with a sexually transmitted infection.  If you are at risk for HIV, ask about PrEP medicine to prevent HIV.  Get tested for HIV at least once in your life, whether you are at risk for HIV or not.  Cancer screening tests  Cervical cancer screening: If you have a cervix, begin getting regular cervical cancer screening tests starting at age 21.  Breast cancer scan (mammogram): If you've ever had breasts, begin having regular mammograms starting at age 40. This is a scan to check for breast cancer.  Colon cancer screening: It is important to start screening for colon cancer at age 45.  Have a colonoscopy test every 10 years (or more often if you're at risk) Or, ask your provider about stool tests like a FIT test every year or Cologuard test every 3 years.  To learn more about your testing options, visit:   .  For help making a decision, visit:   https://bit.ly/wz30493.  Prostate cancer screening test: If you have a prostate, ask your care team if a prostate cancer screening test (PSA) at age 55 is right for you.  Lung cancer screening: If you are a current or former smoker ages 50 to 80, ask your care team if ongoing lung cancer  screenings are right for you.  For informational purposes only. Not to replace the advice of your health care provider. Copyright   2023 [facility]. All rights reserved. Clinically reviewed by the [facility] Transitions Program. Souqalmal 131545 - REV 01/24.  Safer Sex: Care Instructions  Overview  Safer sex is a way to reduce your risk of getting a sexually transmitted infection (STI). It can also help prevent pregnancy.  Several products can help you practice safer sex and reduce your chance of STIs. One of the best is a condom. There are internal and external condoms. You can use a special rubber sheet (dental dam) for protection during oral sex. Disposable gloves can keep your hands from touching blood, semen, or other body fluids that can carry infections.  Remember that birth control methods such as diaphragms, IUDs, foams, and birth control pills do not stop you from getting STIs.  Follow-up care is a key part of your treatment and safety. Be sure to make and go to all appointments, and call your doctor if you are having problems. It's also a good idea to know your test results and keep a list of the medicines you take.  How can you care for yourself at home?  Think about getting vaccinated to help prevent hepatitis A, hepatitis B, and human papillomavirus (HPV). They can be spread through sex.  Use a condom every time you have sex. Use an external condom, which goes on the penis. Or use an internal condom, which goes into the vagina or anus.  Make sure you use the right size external condom. A condom that's too small can break easily. A condom that's too big can slip off during sex.  Use a new condom each time you have sex. Be careful not to poke a hole in the condom when you open the wrapper.  Don't use an internal condom and an external condom at the same time.  Never use petroleum jelly (such as Vaseline), grease, hand lotion, baby oil, or anything with oil in it. These products can  "make holes in the condom.  After intercourse, hold the edge of the condom as you remove it. This will help keep semen from spilling out of the condom.  Do not have sex with anyone who has symptoms of an STI, such as sores on the genitals or mouth.  Do not drink a lot of alcohol or use drugs before sex.  Limit your sex partners. Sex with one partner who has sex only with you can reduce your risk of getting an STI.  Don't share sex toys. But if you do share them, use a condom and clean the sex toys between each use.  Talk to any partners before you have sex. Talk about what you feel comfortable with and whether you have any boundaries with sex. And find out if your partner or partners may be at risk for any STI. Keep in mind that a person may be able to spread an STI even if they do not have symptoms. You and any partners may want to get tested for STIs.  Where can you learn more?  Go to https://www.[employer].net/patiented  Enter B608 in the search box to learn more about \"Safer Sex: Care Instructions.\"  Current as of: November 27, 2023  Content Version: 14.2 2024 [facility].   Care instructions adapted under license by your healthcare professional. If you have questions about a medical condition or this instruction, always ask your healthcare professional. Healthwise[employer] Inc[employer] disclaims any warranty or liability for your use of this information.       "

## 2024-12-03 NOTE — PROGRESS NOTES
Prior to immunization administration, verified patients identity using patient s name and date of birth. Please see Immunization Activity for additional information.     Screening Questionnaire for Adult Immunization    Are you sick today?   No   Do you have allergies to medications, food, a vaccine component or latex?   No   Have you ever had a serious reaction after receiving a vaccination?   No   Do you have a long-term health problem with heart, lung, kidney, or metabolic disease (e.g., diabetes), asthma, a blood disorder, no spleen, complement component deficiency, a cochlear implant, or a spinal fluid leak?  Are you on long-term aspirin therapy?   No   Do you have cancer, leukemia, HIV/AIDS, or any other immune system problem?   No   Do you have a parent, brother, or sister with an immune system problem?   No   In the past 3 months, have you taken medications that affect  your immune system, such as prednisone, other steroids, or anticancer drugs; drugs for the treatment of rheumatoid arthritis, Crohn s disease, or psoriasis; or have you had radiation treatments?   No   Have you had a seizure, or a brain or other nervous system problem?   No   During the past year, have you received a transfusion of blood or blood    products, or been given immune (gamma) globulin or antiviral drug?   No   For women: Are you pregnant or is there a chance you could become       pregnant during the next month?   No   Have you received any vaccinations in the past 4 weeks?   No     Immunization questionnaire answers were all negative.      Patient instructed to remain in clinic for 15 minutes afterwards, and to report any adverse reactions.     Screening performed by Yoko Baugh on 12/3/2024 at 4:17 PM.

## 2024-12-03 NOTE — PROGRESS NOTES
Preventive Care Visit  [facility]  Amrita Jansen MD, Family Medicine  Dec 3, 2024      Assessment & Plan     Routine general medical examination at a health care facility  Vaccines, screening as below.      Encounter for counseling for travel  -Vaccines given: Hep A #1, COVID, Flu, Typhoid fever  -Malaria prophylaxis not indicated  -Advice for travel reviewed: traveler's insurance, high altitude, avoid contact with animals/stray dogs, STI prevention.    Traveler's diarrhea  - azithromycin (ZITHROMAX) 500 MG tablet; Take 1 tablet (500 mg) by mouth daily for 3 days.  - loperamide (IMODIUM A-D) 2 MG tablet; Take 2 tablets at onset of diarrhea.  Repeat with 1 tablet after each additional loose stool as needed.    High risk heterosexual behavior  Long discussion about sexual behavior, reasons for increase in sexual activity.  History of addiction (chemical, gambling, food).    - encouraged therapy, he will consider  - given HPV #2    Screening examination for venereal disease  Recommended using condoms consistently  - Chlamydia trachomatis/Neisseria gonorrhoeae by PCR (first-voided urine)  - HIV Antigen Antibody Combo Cascade; Future  - Treponema Abs w Reflex to RPR and Titer; Future  - hepatitis C    Fertility testing  Patient with concern about fertility.    - Semen Analysis, Strict Morphology (MECHE); Future    Patient has been advised of split billing requirements and indicates understanding: No        Counseling  Appropriate preventive services were addressed with this patient via screening, questionnaire, or discussion as appropriate for fall prevention, nutrition, physical activity, Tobacco-use cessation, social engagement, weight loss and cognition.  Checklist reviewing preventive services available has been given to the patient.  Reviewed patient's diet, addressing concerns and/or questions.   He is at risk for lack of exercise and has been provided with information to  increase physical activity for the benefit of his well-being.   The patient was instructed to see the dentist every 6 months.       FUTURE APPOINTMENTS:       - return for lab only appt (unable to draw blood at this time) then return in 6 mo for HPV and Hep A vaccines      Subjective   Howard is a 34 year old, presenting for the following:  Physical (Any vaccine if needed, traveling  in Feb) and STD        12/3/2024     3:46 PM   Additional Questions   Roomed by Yoko mo         12/3/2024    Information    services provided? No           Traveling to  for one week, then to [facility] for 3 weeks.   Departure 2/25/24 and return 4/2/25.    Traveling with a group, spiritual trip to [facility] as high as 18,500 ft.  Has never been at high altitude in past.    Has had motion sickness in the past.    A lot of stray dogs.      Would like to have STI testing as well.   10 partners in past 2 months.  Feels almost a compulsion.    Thinks it is related to mental health, spirituality and concerns about having children.  Would like to have fertility testing.   Has history of gambling.  Prior drinking, sober x 10 years.          12/3/2024   General Health   How would you rate your overall physical health? Excellent   Feel stress (tense, anxious, or unable to sleep) Not at all            12/3/2024   Nutrition   Three or more servings of calcium each day? Yes   Diet: Regular (no restrictions)   How many servings of fruit and vegetables per day? (!) 2-3   How many sweetened beverages each day? 0-1            12/3/2024   Exercise   Days per week of moderate/strenous exercise 3 days            12/3/2024   Social Factors   Frequency of gathering with friends or relatives Once a week   Worry food won't last until get money to buy more No   Food not last or not have enough money for food? No   Do you have housing? (Housing is defined as stable permanent housing and does not include staying ouside in a  car, in a tent, in an abandoned building, in an overnight shelter, or couch-surfing.) Yes   Are you worried about losing your housing? No   Lack of transportation? No   Unable to get utilities (heat,electricity)? No            12/3/2024   Dental   Dentist two times every year? (!) NO            12/3/2024   TB Screening   Were you born outside of the [place]? No            Today's PHQ-2 Score:       12/3/2024     3:44 PM   PHQ-2 ( 1999 [employer])   Q1: Little interest or pleasure in doing things 0    Q2: Feeling down, depressed or hopeless 0    PHQ-2 Score 0    Q1: Little interest or pleasure in doing things Not at all   Q2: Feeling down, depressed or hopeless Not at all   PHQ-2 Score 0       Patient-reported           12/3/2024   Substance Use   Alcohol more than 3/day or more than 7/wk No   Do you use any other substances recreationally? No        Social History     Tobacco Use    Smoking status: Never    Smokeless tobacco: Never           12/3/2024   STI Screening   New sexual partner(s) since last STI/HIV test? (!) YES             12/3/2024   Contraception/Family Planning   Questions about contraception or family planning No           Reviewed and updated as needed this visit by Provider                    Patient Active Problem List   Diagnosis    Lipoma    Alcoholism in remission (H)    Nephrolithiasis    Polysubstance abuse (H)     History reviewed. No pertinent surgical history.    Social History     Tobacco Use    Smoking status: Never    Smokeless tobacco: Never   Substance Use Topics    Alcohol use: Not Currently     Family History   Problem Relation Age of Onset    Uterine Cancer Father              Review of Systems  Constitutional, HEENT, cardiovascular, pulmonary, gi and gu systems are negative, except as otherwise noted.     Objective    Exam  /82   Pulse 80   Temp 98.7  F (37.1  C) (Oral)   Resp 20   SpO2 98%    Estimated body mass index is 29.84 kg/m  as calculated from the following:    Height as  "of 9/19/22: 1.778 m (5' 10\").    Weight as of 9/19/22: 94.3 kg (208 lb).    Physical Exam  GENERAL: alert and no distress  NECK: no adenopathy, no asymmetry, masses, or scars  RESP: lungs clear to auscultation - no rales, rhonchi or wheezes  CV: regular rate and rhythm, normal S1 S2, no S3 or S4, no murmur, click or rub, no peripheral edema  MS: no gross musculoskeletal defects noted, no edema        Signed Electronically by: Amrita Jansen MD    "

## 2024-12-05 ENCOUNTER — LAB (OUTPATIENT)
Dept: LAB | Facility: CLINIC | Age: 34
End: 2024-12-05
Payer: COMMERCIAL

## 2024-12-05 DIAGNOSIS — Z13.9 SCREENING FOR CONDITION: ICD-10-CM

## 2024-12-05 DIAGNOSIS — Z11.3 SCREENING EXAMINATION FOR VENEREAL DISEASE: ICD-10-CM

## 2024-12-05 LAB
C TRACH DNA SPEC QL PROBE+SIG AMP: NEGATIVE
N GONORRHOEA DNA SPEC QL NAA+PROBE: NEGATIVE

## 2025-04-09 ENCOUNTER — OFFICE VISIT (OUTPATIENT)
Dept: FAMILY MEDICINE | Facility: CLINIC | Age: 35
End: 2025-04-09
Payer: COMMERCIAL

## 2025-04-09 ENCOUNTER — ANCILLARY PROCEDURE (OUTPATIENT)
Dept: GENERAL RADIOLOGY | Facility: CLINIC | Age: 35
End: 2025-04-09
Attending: FAMILY MEDICINE
Payer: COMMERCIAL

## 2025-04-09 VITALS
TEMPERATURE: 97.5 F | DIASTOLIC BLOOD PRESSURE: 70 MMHG | OXYGEN SATURATION: 96 % | HEIGHT: 70 IN | HEART RATE: 74 BPM | RESPIRATION RATE: 18 BRPM | SYSTOLIC BLOOD PRESSURE: 112 MMHG | BODY MASS INDEX: 29.84 KG/M2

## 2025-04-09 DIAGNOSIS — R09.82 POSTNASAL DRIP: ICD-10-CM

## 2025-04-09 DIAGNOSIS — R05.2 SUBACUTE COUGH: Primary | ICD-10-CM

## 2025-04-09 DIAGNOSIS — R19.5 LOOSE STOOLS: ICD-10-CM

## 2025-04-09 DIAGNOSIS — R05.2 SUBACUTE COUGH: ICD-10-CM

## 2025-04-09 PROCEDURE — 71046 X-RAY EXAM CHEST 2 VIEWS: CPT | Mod: TC | Performed by: RADIOLOGY

## 2025-04-09 RX ORDER — IPRATROPIUM BROMIDE 42 UG/1
2 SPRAY, METERED NASAL 4 TIMES DAILY PRN
Qty: 15 ML | Refills: 1 | Status: SHIPPED | OUTPATIENT
Start: 2025-04-09

## 2025-04-09 NOTE — PROGRESS NOTES
"  Assessment & Plan     Subacute cough  Postnasal drip  Loose stools  4 weeks of symptoms, onset right around time of arrival in [facility] from , no concerning exposure such as mosquito, bat, bug bites that would increase risk for concerning travel illnesses. Was around people with similar symptoms that lasted 4-6 weeks, cough somewhat productive more so in the mornings, no concerning findings on exam except mild coarse wheeze in left upper lobe though normal CXR along with normal vitals rules out many concerning illnesses. Most likely post-viral cough and postnasal drip, expect to improve with time, offered ipratropium to use PRN which was accepted. Reassured no need for abx.        No follow-ups on file.        Subjective   Howard is a 34 year old, presenting for the following health issues:  Sick (Traveled to  and [facility] and get sick. Pt started having bloody nose and coughing while in [facility] and took some antibiotic but not getting better. The sickness been going on for about 4-6 weeks now. /)      HPI      Illness after travel, was in  and then [facility] for 3 weeks. Did come in for travel counseling. Was given azithromycin for traveler's diarrhea.  - onset about 4 weeks ago, while at [facility]  - onset quickly after arrival in [facility]  - returned roughly one week ago  - symptoms unchanging except slightly worse  - bloody nose when he blows it, when he wakes up  - dry cough  - no fevers, chills early on or now  - feels \"detached\", brain fog  - no abdominal pain  - watery diarrhea 3-4x daily, off and on not daily, soft more formed stools  - no rashes  - headaches  - no shortness of breath  - around other people that were ill, similar symptoms, no known cause  - not around stray dogs, no mosquitos  - did have single sexual encounter, person he is familiar with, unprotected, partner had no known history of STIs, no dysuria, penile drainage, joint pains,                   Objective    /70 (BP Location: Right " "arm)   Pulse 74   Temp 97.5  F (36.4  C) (Oral)   Resp 18   Ht 1.778 m (5' 10\")   SpO2 96%   BMI 29.84 kg/m    Body mass index is 29.84 kg/m .    Physical Exam   General: Non-ill appearing. No acute distress. Well kept.  Skin: No rashes or concerning lesions. 4 lipomas present on left arm.  Head: Normocephalic, atraumatic. No pain to palpation over the maxillary/nasal sinuses  Eyes: Symmetric pupils. EOMI. No conjunctival injection.   Ears: Canals patent without discharge. Tympanic membranes pearly grey, no bulging or fluid collection.  Nose: Dry crusted blood in nares, no active bleeds or concerning lesions. Turbinates non-enlarged.  Mouth/Oropharynx: Normal appearing lips/tongue, no oropharyngeal erythema/lesions, tonsils/adenoids not enlarged and without exudates, no evidence of postnasal drip  Respiratory: No signs of respiratory distress. Mild crackle left upper lobe, otherwise clear.  Cardiovascular: RRR, no murmur or gallop. Normal S1 and S2. Appears well perfused.  Neuro: Awake, A&O x3. Cranial nerves 2-12 grossly intact.  Psych: Appropriate mood and affect                Signed Electronically by: Juan Carlos Rogel MD    "

## 2025-04-10 NOTE — PROGRESS NOTES
Preceptor Attestation:   Patient seen, evaluated and discussed with the resident. I personally reviewed the imaging and agree with the interpretation documented by the resident. I have verified the content of the note, which accurately reflects my assessment of the patient and the plan of care.  Supervising Physician:Marco Antonio Brasher MD  [facility]